# Patient Record
Sex: MALE | Race: OTHER | HISPANIC OR LATINO | URBAN - METROPOLITAN AREA
[De-identification: names, ages, dates, MRNs, and addresses within clinical notes are randomized per-mention and may not be internally consistent; named-entity substitution may affect disease eponyms.]

---

## 2024-10-03 ENCOUNTER — EMERGENCY (EMERGENCY)
Facility: HOSPITAL | Age: 39
LOS: 1 days | Discharge: HOME | End: 2024-10-03
Attending: EMERGENCY MEDICINE
Payer: SELF-PAY

## 2024-10-03 VITALS
TEMPERATURE: 98 F | SYSTOLIC BLOOD PRESSURE: 167 MMHG | DIASTOLIC BLOOD PRESSURE: 83 MMHG | HEART RATE: 123 BPM | OXYGEN SATURATION: 98 % | RESPIRATION RATE: 18 BRPM

## 2024-10-03 PROCEDURE — 99053 MED SERV 10PM-8AM 24 HR FAC: CPT

## 2024-10-03 PROCEDURE — 99283 EMERGENCY DEPT VISIT LOW MDM: CPT

## 2024-10-03 NOTE — ED ADULT TRIAGE NOTE - CHIEF COMPLAINT QUOTE
Pt BIBEMS from home s/p ingesting 4-6 beers and an edible candy. Pt is lethargic at this time but alert to verbal stimuli. Pt denies SI/HI, Pt to be changed into yellow gown and belongings to be secured. Pt denies drinking daily and denies drugs,

## 2024-10-04 VITALS
OXYGEN SATURATION: 97 % | HEART RATE: 126 BPM | SYSTOLIC BLOOD PRESSURE: 129 MMHG | RESPIRATION RATE: 18 BRPM | DIASTOLIC BLOOD PRESSURE: 73 MMHG | TEMPERATURE: 100 F

## 2024-10-04 PROCEDURE — T1013: CPT

## 2024-10-04 PROCEDURE — 99282 EMERGENCY DEPT VISIT SF MDM: CPT

## 2024-10-04 NOTE — ED PROVIDER NOTE - OBJECTIVE STATEMENT
Patient presents to the ED status post marijuana gummy ingestion and alcohol use no SI no HI no headache no chest pain shortness breath no abdominal pain no motor or sensory deficits no nausea vomit diarrhea denies other drug abuse

## 2024-10-04 NOTE — ED PROVIDER NOTE - NSFOLLOWUPINSTRUCTIONS_ED_ALL_ED_FT
Paciente: MIRTHA PUCKETT  Profesional que asiste al paciente: Glenroy Matos  Trastorno por consumo de bebidas alcohólicas  Alcohol Use Disorder    El trastorno por consumo de bebidas alcohólicas es romeo afección en la que el consumo de alcohol altera la raymond cotidiana. Las personas con esta afección consumen bebidas alcohólicas en exceso y no pueden controlar stapleton consumo.    El trastorno por consumo de bebidas alcohólicas puede causar problemas graves en la galina física. Puede afectar al cerebro, al corazón y a otros órganos internos. Aniyah trastorno puede aumentar el riesgo de padecer ciertos tipos de cáncer y causar problemas de galina mental, maureen depresión o ansiedad.    ¿Cuáles son las causas?  Esta afección se debe al consumo excesivo de alcohol con el transcurso del tiempo. Algunas personas que sufren esta afección beben para enfrentarse a situaciones negativas de la raymond o escapar de ellas. Otros beben para aliviar el dolor o los síntomas de romeo enfermedad mental.    ¿Qué incrementa el riesgo?  Es más probable que sufra esta afección si:    Tiene antecedentes familiares de trastorno por consumo de bebidas alcohólicas.  Stapleton cultura fomenta el consumo de bebidas alcohólicas hasta el punto de emborracharse (intoxicación alcohólica).  Tuvo un trastorno emocional o de conducta en la infancia.  Ha sufrido abusos.  Es adolescente y:    Tiene un desempeño deficiente en la escuela.  Recibe poca supervisión o guía.  Actúa de forma impulsiva y le gusta moe riesgos.    ¿Cuáles son los signos o síntomas?  Los síntomas de esta afección incluyen:    Beber más de lo que desea.  Intentar beber menos en varias ocasiones, sin éxito.  Pasar mucho tiempo pensando en el alcohol, intentando conseguir alcohol, bebiendo o recuperándose de tracey bebido.  Continuar bebiendo incluso cuando esto le causa problemas graves en stapleton raymond cotidiana.  Beber cuando es peligroso hacerlo, por ejemplo, antes de conducir.  Necesitar cada vez más alcohol para obtener el mismo efecto que busca (generar tolerancia).  Tener síntomas de abstinencia al dejar de beber. Entre los síntomas de abstinencia se incluyen los siguientes:    Dificultad para dormir, que produce cansancio (fatiga).  Cambios en el estado de ánimo, con depresión y ansiedad.  Síntomas físicos, maureen frecuencia cardíaca acelerada, respiración rápida, presión arterial elevada (hipertensión), fiebre, sudoración fría o náuseas.  Convulsiones.  Confusión grave.  Daly o sentir cosas que no existen (alucinaciones).  Temblores que no se pueden controlar.    ¿Cómo se diagnostica?  Aniyah trastorno se diagnostica mediante romeo evaluación. Para comenzar, el médico puede hacerle abram o cuatro preguntas sobre stapleton consumo de alcohol o entregarle romeo prueba sencilla que deberá realizar. [place] ayudará a obtener información anatoliy sobre usted.    Pueden hacerle un examen físico o análisis. Pueden derivarlo a un terapeuta especializado en abuso de sustancias.    ¿Cómo se trata?     Con la educación, algunas personas con trastorno por consumo de bebidas alcohólicas pueden reducir stapleton consumo. Muchas personas con aniyah trastorno no son capaces de modificar stapleton comportamiento por stapleton cuenta y necesitan la ayuda de especialistas en abuso de sustancias. Estos especialistas son terapeutas que pueden ayudar a diagnosticar la gravedad de stapleton trastorno y a decidir qué tipo de tratamiento necesita. Los tratamientos pueden incluir lo siguiente:    Desintoxicación. La desintoxicación consiste en dejar de beber, con la supervisión y las instrucciones de los médicos. El médico puede recetarle medicamentos en la primera semana para ayudar a disminuir los síntomas de la abstinencia. La abstinencia puede ser peligrosa y potencialmente mortal. La desintoxicación puede realizarse en el hogar, en un ámbito extrahospitalario, en un centro de atención primaria, en un hospital o en un centro de tratamiento para abuso de sustancias.  Asesoramiento psicológico. Puede incluir entrevistas motivacionales (EM), terapia familiar o terapia cognitivo-conductual (TCC). Lo realizan terapeutas profesionales o especializados en el tratamiento de pacientes que abusan de sustancias. Un terapeuta puede abordar cómo cambiar stapleton comportamiento de consumo de bebidas alcohólicas y cómo mantener los cambios. La terapia tiene maureen objetivos:    Identificar loli motivaciones positivas para cambiar.  Identificar y evitar aquello que lo conduzca a beber alcohol.  Enseñarle a planificar stapleton cambio de comportamiento.  Proponer sistemas de apoyo que puedan ayudarlo a mantener el cambio.  Medicamentos. Los medicamentos pueden ayudar a tratar aniyah trastorno de la siguiente manera:    Disminuyen el deseo intenso de consumir.  Reducen el sentimiento positivo que experimenta al beber alcohol.  Causan romeo reacción física desagradable cuando sekou alcohol (terapia de aversión).  Grupos de apoyo mutuo maureen Alcohólicos Anónimos (AA). Estos grupos son dirigidos por personas que perkins superado st[place]leton problema con la bebida. Estos grupos brindan apoyo emocional, consejos y orientación.    Algunas personas con esta afección se benefician del tratamiento combinado que se ofrece en algunos centros de tratamiento especializados en el abuso de sustancias.    Siga estas instrucciones en stapleton casa:         Medicamentos    Use los medicamentos de venta calvin y los recetados solamente maureen se lo haya indicado el médico.  Consulte antes de empezar a moe cualquier medicamento, hierbas o suplementos nuevos.        Instrucciones generales    Pídales a loli amistades y familiares que apoyen stapleton decisión de mantenerse sobrio.  Evite situaciones en las que se sirva alcohol.  Bhavna un plan para lidiar con las situaciones tentadoras.  Asista a grupos de apoyo con regularidad.  Practique pasatiempos o actividades que le gusten.  No conduzca después de beber alcohol.  Concurra a todas las visitas de seguimiento maureen se lo haya indicado el médico. [place] es importante.    ¿Cómo se previene?  Si sekou alcohol:    Limite la cantidad que sekou:    De 0 a 1 medida por día para las mujeres que no estén embarazadas.  De 0 a 2 medidas por día para los hombres.  Esté atento a la cantidad de alcohol que hay en las bebidas que chanell. En los [place], romeo medida equivale a romeo botella de cerveza de 12 oz (355 ml), un vaso de vino de 5 oz (148 ml) o un vaso de romeo bebida alcohólica de alistair graduación de 1½ oz (44 ml).  Si tiene romeo afección de galina mental, busque tratamiento. Lleve un estilo de raymond saludable, que puede incorporar lo siguiente:    Meditación o respiración profunda.  Realizar actividad física.  Pasar tiempo en contacto con la naturaleza.   Escuchar música.  Charlar con algún familiar o amigo de confianza.  Si eres adolescente:    No bebas alcohol. Saba las reuniones en las que puedas tener la tentación de beber.  No tengas miedo de negarte si alguien te ofrece alcohol. Habla sin reservas acerca de por qué no quieres beber alcohol. Sé un ejemplo positivo para los demás al no consumir alcohol.  Construye relaciones con amigos que no beban.    Dónde buscar más información  Substance Abuse and Mental Health Services Administration (Administración de Servicios por Abuso de Sustancias y Galina Mental): samhsa.gov  Alcohólicos Anónimos: aa.org    Comuníquese con un médico si:  No puede moe los medicamentos maureen se lo perkins indicado.  Loli síntomas empeoran o tiene síntomas de abstinencia cuando jenniffer de beber.  Vuelve a comenzar a beber (recaída) y los síntomas empeoran.    Solicite ayuda de inmediato si:  Tiene pensamientos acerca de lastimarse a usted mismo o a otras personas.    Si alguna vez siente que puede lastimarse o lastimar a otras personas, o tiene pensamientos de poner fin a stap[place] raymond, busque ayuda de inmediato. Diríjase al departamento de emergencias más cercano o:     Comuníquese con el servicio de emergencias de st[place] localidad (911 en los [place]).  Llame a romeo línea de asistencia al suicida y atención en crisis maureen National Suicide Prevention Lifeline (Línea Nacional de Prevención del Suicidio) al 1-596.844.8014. Está disponible las 24 horas del día en los [place]  Envíe un mensaje de texto a la línea para casos de crisis al [id number] (en los [place]    Resumen  El trastorno por consumo de bebidas alcohólicas es romeo afección en la que el consumo de alcohol altera la raymond cotidiana. Las personas con esta afección consumen bebidas alcohólicas en exceso y no pueden controlar stapleton consumo.  El tratamiento puede incluir desintoxicación, asesoramiento psicológico, medicamentos y grupos de apoyo.  Pida ayuda a amigos y familiares. Evite situaciones en las que se sirva alcohol.  Busque ayuda de inmediato si tiene pensamientos acerca de lastimarse a usted mismo o a otras personas.    NOTAS ADICIONALES E INSTRUCCIONES    Please follow up with your Primary MD in 24-48 hr.  Seek immediate medical care for any new/worsening signs or symptoms.     Document Released: 1/25/2006 Document Revised: 11/5/2020 Document Reviewed: 11/5/2020  Elsevier Interactive Patient Education ©2019 [employer]. This information is not intended to replace advice given to you by your health care provider. Make sure you discuss any questions you have with your health care provider.

## 2024-10-04 NOTE — ED ADULT NURSE NOTE - NSFALLUNIVINTERV_ED_ALL_ED
Bed/Stretcher in lowest position, wheels locked, appropriate side rails in place/Call bell, personal items and telephone in reach/Instruct patient to call for assistance before getting out of bed/chair/stretcher/Non-slip footwear applied when patient is off stretcher/[place] to call system/Physically safe environment - no spills, clutter or unnecessary equipment/Purposeful proactive rounding/Room/bathroom lighting operational, light cord in reach

## 2024-10-04 NOTE — ED PROVIDER NOTE - PATIENT PORTAL LINK FT
You can access the FollowMyHealth Patient Portal offered by [facility] by registering at the following website: [email or web address]. By joining [facility]’s FollowMyHealth portal, you will also be able to view your health information using other applications (apps) compatible with our system.